# Patient Record
Sex: FEMALE | Race: WHITE
[De-identification: names, ages, dates, MRNs, and addresses within clinical notes are randomized per-mention and may not be internally consistent; named-entity substitution may affect disease eponyms.]

---

## 2018-03-27 ENCOUNTER — HOSPITAL ENCOUNTER (OUTPATIENT)
Dept: HOSPITAL 89 - LAB | Age: 58
End: 2018-03-27
Attending: NURSE PRACTITIONER
Payer: COMMERCIAL

## 2018-03-27 VITALS — BODY MASS INDEX: 33.47 KG/M2

## 2018-03-27 DIAGNOSIS — E03.9: ICD-10-CM

## 2018-03-27 DIAGNOSIS — D64.9: ICD-10-CM

## 2018-03-27 DIAGNOSIS — I10: Primary | ICD-10-CM

## 2018-03-27 LAB — PLATELET COUNT, AUTOMATED: 320 K/UL (ref 150–450)

## 2018-03-27 PROCEDURE — 85025 COMPLETE CBC W/AUTO DIFF WBC: CPT

## 2018-03-27 PROCEDURE — 82565 ASSAY OF CREATININE: CPT

## 2018-03-27 PROCEDURE — 82310 ASSAY OF CALCIUM: CPT

## 2018-03-27 PROCEDURE — 82374 ASSAY BLOOD CARBON DIOXIDE: CPT

## 2018-03-27 PROCEDURE — 84443 ASSAY THYROID STIM HORMONE: CPT

## 2018-03-27 PROCEDURE — 84520 ASSAY OF UREA NITROGEN: CPT

## 2018-03-27 PROCEDURE — 82947 ASSAY GLUCOSE BLOOD QUANT: CPT

## 2018-03-27 PROCEDURE — 82435 ASSAY OF BLOOD CHLORIDE: CPT

## 2018-03-27 PROCEDURE — 84132 ASSAY OF SERUM POTASSIUM: CPT

## 2018-03-27 PROCEDURE — 84295 ASSAY OF SERUM SODIUM: CPT

## 2018-03-27 PROCEDURE — 36415 COLL VENOUS BLD VENIPUNCTURE: CPT

## 2018-10-19 ENCOUNTER — HOSPITAL ENCOUNTER (OUTPATIENT)
Dept: HOSPITAL 89 - LAB | Age: 58
End: 2018-10-19
Attending: NURSE PRACTITIONER
Payer: COMMERCIAL

## 2018-10-19 VITALS — BODY MASS INDEX: 33.47 KG/M2

## 2018-10-19 DIAGNOSIS — E03.9: Primary | ICD-10-CM

## 2018-10-19 DIAGNOSIS — Z11.59: ICD-10-CM

## 2018-10-19 DIAGNOSIS — I10: ICD-10-CM

## 2018-10-19 LAB
LDLC SERPL-MCNC: 117 MG/DL
PLATELET COUNT, AUTOMATED: 315 K/UL (ref 150–450)

## 2018-10-19 PROCEDURE — 82374 ASSAY BLOOD CARBON DIOXIDE: CPT

## 2018-10-19 PROCEDURE — 86803 HEPATITIS C AB TEST: CPT

## 2018-10-19 PROCEDURE — 84520 ASSAY OF UREA NITROGEN: CPT

## 2018-10-19 PROCEDURE — 82247 BILIRUBIN TOTAL: CPT

## 2018-10-19 PROCEDURE — 84295 ASSAY OF SERUM SODIUM: CPT

## 2018-10-19 PROCEDURE — 84155 ASSAY OF PROTEIN SERUM: CPT

## 2018-10-19 PROCEDURE — 84450 TRANSFERASE (AST) (SGOT): CPT

## 2018-10-19 PROCEDURE — 84443 ASSAY THYROID STIM HORMONE: CPT

## 2018-10-19 PROCEDURE — 82947 ASSAY GLUCOSE BLOOD QUANT: CPT

## 2018-10-19 PROCEDURE — 84132 ASSAY OF SERUM POTASSIUM: CPT

## 2018-10-19 PROCEDURE — 84478 ASSAY OF TRIGLYCERIDES: CPT

## 2018-10-19 PROCEDURE — 36415 COLL VENOUS BLD VENIPUNCTURE: CPT

## 2018-10-19 PROCEDURE — 82465 ASSAY BLD/SERUM CHOLESTEROL: CPT

## 2018-10-19 PROCEDURE — 82435 ASSAY OF BLOOD CHLORIDE: CPT

## 2018-10-19 PROCEDURE — 84460 ALANINE AMINO (ALT) (SGPT): CPT

## 2018-10-19 PROCEDURE — 83718 ASSAY OF LIPOPROTEIN: CPT

## 2018-10-19 PROCEDURE — 82040 ASSAY OF SERUM ALBUMIN: CPT

## 2018-10-19 PROCEDURE — 85025 COMPLETE CBC W/AUTO DIFF WBC: CPT

## 2018-10-19 PROCEDURE — 82565 ASSAY OF CREATININE: CPT

## 2018-10-19 PROCEDURE — 82310 ASSAY OF CALCIUM: CPT

## 2018-10-19 PROCEDURE — 84075 ASSAY ALKALINE PHOSPHATASE: CPT

## 2019-01-04 ENCOUNTER — HOSPITAL ENCOUNTER (OUTPATIENT)
Dept: HOSPITAL 89 - MAMO | Age: 59
End: 2019-01-04
Attending: NURSE PRACTITIONER
Payer: COMMERCIAL

## 2019-01-04 VITALS — BODY MASS INDEX: 33.47 KG/M2

## 2019-01-04 DIAGNOSIS — R92.8: Primary | ICD-10-CM

## 2019-01-04 PROCEDURE — 77063 BREAST TOMOSYNTHESIS BI: CPT

## 2019-01-04 PROCEDURE — 77067 SCR MAMMO BI INCL CAD: CPT

## 2019-01-04 NOTE — RADIOLOGY IMAGING REPORT
FACILITY: [facility] [place] 

 

PATIENT NAME: RICHI CUMMINGS

: [id number]

MR: [id number]

V: [id number]

EXAM DATE: 

ORDERING PHYSICIAN: POORNIMA OHARA

TECHNOLOGIST: Anahi Baptiste

 

PROCEDURE:BILATERAL DIGITAL SCREENING MAMMOGRAM WITH CAD ASSISTED 

INTERPRETATION & 3D TOMOSYNTHESIS 

 

COMPARISON:Prior mammograms 17.

 

INDICATIONS:screening

 

FINDINGS:  

The anterior 1/3 of the breasts are heterogeneously dense which can 

obscure small masses. Just above the mid nipple line in the middle 1/3 

of the Left breast on the Left MLO view there is a focal asymmetry that 

appears more prominent when compared to the prior study for which Spot 

compression view is recommended. This is best seen on Tomographic slice 

21. The remainder of the parenchymal pattern has remained stable.

 

DIAGNOSTIC CATEGORY 0--INCOMPLETE: NEED ADDITIONAL IMAGING EVALUATION.  

 

 

RECOMMENDATIONS:

ADDITIONAL MAMMOGRAPHIC VIEWS REQUIRED: LEFT BREAST.    

 

IMPRESSION:

BIRADS 0: incomplete. 

Additional views of the Left breast recommended as described.

 

 

 

 

 

 

 

 

Dictated by:  Love Trotter M.D. on 2019 at 14:40   

Transcribed by: FIX on 2019 at 14:51    

Approved by:  Love Trotter M.D. on 2019 at 15:43   

[facility]

## 2019-02-14 ENCOUNTER — HOSPITAL ENCOUNTER (OUTPATIENT)
Dept: HOSPITAL 89 - LAB | Age: 59
End: 2019-02-14
Attending: NURSE PRACTITIONER
Payer: COMMERCIAL

## 2019-02-14 VITALS — BODY MASS INDEX: 33.47 KG/M2

## 2019-02-14 DIAGNOSIS — I10: Primary | ICD-10-CM

## 2019-02-14 PROCEDURE — 82947 ASSAY GLUCOSE BLOOD QUANT: CPT

## 2019-02-14 PROCEDURE — 36415 COLL VENOUS BLD VENIPUNCTURE: CPT

## 2019-02-14 PROCEDURE — 82435 ASSAY OF BLOOD CHLORIDE: CPT

## 2019-02-14 PROCEDURE — 82374 ASSAY BLOOD CARBON DIOXIDE: CPT

## 2019-02-14 PROCEDURE — 84132 ASSAY OF SERUM POTASSIUM: CPT

## 2019-02-14 PROCEDURE — 82565 ASSAY OF CREATININE: CPT

## 2019-02-14 PROCEDURE — 84295 ASSAY OF SERUM SODIUM: CPT

## 2019-02-14 PROCEDURE — 82310 ASSAY OF CALCIUM: CPT

## 2019-02-14 PROCEDURE — 84520 ASSAY OF UREA NITROGEN: CPT
